# Patient Record
Sex: FEMALE | Race: OTHER
[De-identification: names, ages, dates, MRNs, and addresses within clinical notes are randomized per-mention and may not be internally consistent; named-entity substitution may affect disease eponyms.]

---

## 2020-11-23 ENCOUNTER — HOSPITAL ENCOUNTER (EMERGENCY)
Dept: HOSPITAL 62 - ER | Age: 60
LOS: 1 days | Discharge: HOME | End: 2020-11-24
Payer: COMMERCIAL

## 2020-11-23 DIAGNOSIS — R07.9: ICD-10-CM

## 2020-11-23 DIAGNOSIS — R10.811: ICD-10-CM

## 2020-11-23 DIAGNOSIS — I10: ICD-10-CM

## 2020-11-23 DIAGNOSIS — M25.519: ICD-10-CM

## 2020-11-23 DIAGNOSIS — E11.9: ICD-10-CM

## 2020-11-23 DIAGNOSIS — M54.9: ICD-10-CM

## 2020-11-23 DIAGNOSIS — R10.816: ICD-10-CM

## 2020-11-23 DIAGNOSIS — I25.10: ICD-10-CM

## 2020-11-23 DIAGNOSIS — K80.70: Primary | ICD-10-CM

## 2020-11-23 DIAGNOSIS — R11.14: ICD-10-CM

## 2020-11-23 DIAGNOSIS — R10.11: ICD-10-CM

## 2020-11-23 LAB
ADD MANUAL DIFF: NO
ALBUMIN SERPL-MCNC: 4.6 G/DL (ref 3.5–5)
ALP SERPL-CCNC: 63 U/L (ref 38–126)
ANION GAP SERPL CALC-SCNC: 14 MMOL/L (ref 5–19)
APPEARANCE UR: CLEAR
APTT PPP: YELLOW S
AST SERPL-CCNC: 24 U/L (ref 14–36)
BASOPHILS # BLD AUTO: 0 10^3/UL (ref 0–0.2)
BASOPHILS NFR BLD AUTO: 0.5 % (ref 0–2)
BILIRUB DIRECT SERPL-MCNC: 0 MG/DL (ref 0–0.4)
BILIRUB SERPL-MCNC: 2 MG/DL (ref 0.2–1.3)
BILIRUB UR QL STRIP: NEGATIVE
BUN SERPL-MCNC: 24 MG/DL (ref 7–20)
CALCIUM: 10.3 MG/DL (ref 8.4–10.2)
CHLORIDE SERPL-SCNC: 94 MMOL/L (ref 98–107)
CO2 SERPL-SCNC: 31 MMOL/L (ref 22–30)
EOSINOPHIL # BLD AUTO: 1 10^3/UL (ref 0–0.6)
EOSINOPHIL NFR BLD AUTO: 9.2 % (ref 0–6)
ERYTHROCYTE [DISTWIDTH] IN BLOOD BY AUTOMATED COUNT: 12.9 % (ref 11.5–14)
GLUCOSE SERPL-MCNC: 212 MG/DL (ref 75–110)
GLUCOSE UR STRIP-MCNC: NEGATIVE MG/DL
HCT VFR BLD CALC: 39.5 % (ref 36–47)
HGB BLD-MCNC: 13.4 G/DL (ref 12–15.5)
KETONES UR STRIP-MCNC: NEGATIVE MG/DL
LYMPHOCYTES # BLD AUTO: 1.2 10^3/UL (ref 0.5–4.7)
LYMPHOCYTES NFR BLD AUTO: 11.4 % (ref 13–45)
MCH RBC QN AUTO: 28.7 PG (ref 27–33.4)
MCHC RBC AUTO-ENTMCNC: 33.8 G/DL (ref 32–36)
MCV RBC AUTO: 85 FL (ref 80–97)
MONOCYTES # BLD AUTO: 0.8 10^3/UL (ref 0.1–1.4)
MONOCYTES NFR BLD AUTO: 7.4 % (ref 3–13)
NEUTROPHILS # BLD AUTO: 7.6 10^3/UL (ref 1.7–8.2)
NEUTS SEG NFR BLD AUTO: 71.5 % (ref 42–78)
NITRITE UR QL STRIP: NEGATIVE
PH UR STRIP: 6 [PH] (ref 5–9)
PLATELET # BLD: 152 10^3/UL (ref 150–450)
POTASSIUM SERPL-SCNC: 3.9 MMOL/L (ref 3.6–5)
PROT SERPL-MCNC: 8 G/DL (ref 6.3–8.2)
PROT UR STRIP-MCNC: NEGATIVE MG/DL
RBC # BLD AUTO: 4.66 10^6/UL (ref 3.72–5.28)
SP GR UR STRIP: 1.02
TOTAL CELLS COUNTED % (AUTO): 100 %
UROBILINOGEN UR-MCNC: NEGATIVE MG/DL (ref ?–2)
WBC # BLD AUTO: 10.6 10^3/UL (ref 4–10.5)

## 2020-11-23 PROCEDURE — 36415 COLL VENOUS BLD VENIPUNCTURE: CPT

## 2020-11-23 PROCEDURE — 85025 COMPLETE CBC W/AUTO DIFF WBC: CPT

## 2020-11-23 PROCEDURE — 93010 ELECTROCARDIOGRAM REPORT: CPT

## 2020-11-23 PROCEDURE — 96374 THER/PROPH/DIAG INJ IV PUSH: CPT

## 2020-11-23 PROCEDURE — 81001 URINALYSIS AUTO W/SCOPE: CPT

## 2020-11-23 PROCEDURE — 99285 EMERGENCY DEPT VISIT HI MDM: CPT

## 2020-11-23 PROCEDURE — 83690 ASSAY OF LIPASE: CPT

## 2020-11-23 PROCEDURE — 80053 COMPREHEN METABOLIC PANEL: CPT

## 2020-11-23 PROCEDURE — 93005 ELECTROCARDIOGRAM TRACING: CPT

## 2020-11-23 PROCEDURE — 87086 URINE CULTURE/COLONY COUNT: CPT

## 2020-11-23 PROCEDURE — 76705 ECHO EXAM OF ABDOMEN: CPT

## 2020-11-23 PROCEDURE — 96375 TX/PRO/DX INJ NEW DRUG ADDON: CPT

## 2020-11-23 PROCEDURE — 84484 ASSAY OF TROPONIN QUANT: CPT

## 2020-11-23 PROCEDURE — 96361 HYDRATE IV INFUSION ADD-ON: CPT

## 2020-11-23 NOTE — EKG REPORT
SEVERITY:- BORDERLINE ECG -

SINUS RHYTHM

PROBABLE LEFT ATRIAL ABNORMALITY

LEFT AXIS DEVIATION

:

Confirmed by: Zafar Patel MD 23-Nov-2020 17:31:38

## 2020-11-23 NOTE — RADIOLOGY REPORT (SQ)
EXAM DESCRIPTION:  U/S ABDOMEN LIMITED W/O DOP



IMAGES COMPLETED DATE/TIME:  11/23/2020 5:48 pm



REASON FOR STUDY:  Right upper quad pain



COMPARISON:  None.



TECHNIQUE:  Dynamic and static grayscale images acquired of the abdomen and recorded on PACS. Additio
nal selected color Doppler and spectral images recorded.



LIMITATIONS:  None.



FINDINGS:  PANCREAS: No masses.  Visualized pancreatic duct normal caliber.

LIVER: No masses. Echotexture normal.

LIVER VASCULATURE: Normal directional flow of the main portal vein and hepatic veins.

GALLBLADDER: Gallstone(s). No pericholecystic fluid. No wall thickening.

ULTRASOUND-DETECTED FISHMAN'S SIGN: Positive.

INTRAHEPATIC DUCTS AND COMMON DUCT: CBD and intrahepatic ducts normal caliber. No filling defects.

INFERIOR VENA CAVA: Normal flow.

AORTA: No aneurysm.

RIGHT KIDNEY:  Normal size. Normal echogenicity. No solid or suspicious masses.  Incidental note is m
eli of a 2 cm cyst versus calyceal diverticulum.  No hydronephrosis. No calcifications.

PERITONEAL AND RIGHT PLEURAL SPACE: No ascites or effusions.

OTHER: No other significant findings.



IMPRESSION:  Positive sonographic Fishman sign.  Cholelithiasis without evidence of cholecystitis.



TECHNICAL DOCUMENTATION:  JOB ID:  [id number]

 2011 [employer]- All Rights Reserved



Reading location - IP/workstation name: [email or web address]

## 2020-11-23 NOTE — ER DOCUMENT REPORT
ED Medical Screen (RME)





- General


Chief Complaint: Chest Pain


Stated Complaint: CHEST PAIN,NAUSEA,VOMITING


Time Seen by Provider: 11/23/20 16:43


Primary Care Provider: 


STEVE MONTANEZ PA-C [Primary Care Provider] - Follow up as needed


Mode of Arrival: Wheelchair


Information source: Patient, Relative


Notes: 





Patient is a 60-year-old female comes emergency room accompanied by family with 

complaint of right upper quadrant pain radiating to the back.  She also states 

anterior chest pain and she is also short of breath.  Patient states that she 

has a history of 4 cardiac stents in the past.  This pain started in the middle 

of the night last night and has been colicky and spasmatic wise.  Patient states

the spasms have gone more intense and more frequent in the last few hours.  She 

denies any diarrhea but has had vomiting x1 with a green bile type presentation.

 She denies any history of diabetes.  Patient does not smoke drink or do drugs. 

Stated she does state she has been short of breath with this as well.





Patient examination: Patient is a well-nourished well-developed 60-year-old 

female though in no apparent distress is very uncomfortable appearing.


Cardiac: Cardiac shows a regular rate and rhythm without any murmurs.


Lungs: Bilateral breath sounds decreased throughout no rhonchi rales or wheeze 

are noted however patient was noted to be 93% on room air on her pulse ox.


Abdomen: Examination of patient's abdomen shows severe tenderness right upper 

quad area positive Homans most likely.


Chest: Palpation patient anterior chest does not show any reproducible 

tenderness.





Patient was placed on oxygen out in triage secondary to her saturation 93%.  

Denies any known contact with COVID-19.





I have greeted and performed a rapid initial assessment of this patient.  A 

comprehensive ED assessment and evaluation of the patient, analysis of test 

results and completion of the medical decision making process will be conducted 

by additional ED providers.  Dictation of this chart was performed using voice 

recognition software; therefore, there may be some unintended grammatical 

errors.


TRAVEL OUTSIDE OF THE [place] IN LAST 30 DAYS: No





- Related Data


Allergies/Adverse Reactions: 


                                        





No Known Allergies Allergy (Verified 03/07/16 15:10)


   











Physical Exam





- Vital signs


Vitals: 





                                        











Temp Pulse Resp BP Pulse Ox


 


 97.8 F   103 H  19   153/96 H  93 


 


 11/23/20 16:13  11/23/20 16:13  11/23/20 16:13  11/23/20 16:13  11/23/20 16:13














Course





- Vital Signs


Vital signs: 





                                        











Temp Pulse Resp BP Pulse Ox


 


 97.8 F   103 H  19   153/96 H  93 


 


 11/23/20 16:13  11/23/20 16:13  11/23/20 16:13  11/23/20 16:13  11/23/20 16:13














Doctor's Discharge





- Discharge


Referrals: 


STEVE MONTANEZ PA-C [Primary Care Provider] - Follow up as needed

## 2020-11-23 NOTE — ER DOCUMENT REPORT
ED General





- General


Chief Complaint: Chest Pain


Stated Complaint: CHEST PAIN,NAUSEA,VOMITING


Time Seen by Provider: 11/23/20 16:43


Primary Care Provider: 


STEVE MONTANEZ PA-C [Primary Care Provider] - Follow up as needed


Mode of Arrival: Wheelchair


TRAVEL OUTSIDE OF THE [place] IN LAST 30 DAYS: No





- HPI


Notes: 





Patient is a 60-year-old female with a history of coronary artery disease who 

presents the emergency department for evaluation of right upper quadrant pain.  

Last night she had chickpeas for dinner.  About a half an hour later she started

having abdominal pain.  Is in her right upper quadrant.  Radiates around to her 

back, occasionally up into her chest.  Is occasionally in her shoulder as well. 

She rates it a 10 out of 10.  She has had nausea with one episode of bilious 

emesis.  She is felt nauseated all day.  No fevers or chills.  She is never had 

pain like this in the past.  Normal bowel movements.





- Related Data


Allergies/Adverse Reactions: 


                                        





No Known Allergies Allergy (Verified 03/07/16 15:10)


   








Home Medications: List reviewed at bedside





Past Medical History





- General


Information source: Patient, Relative





- Social History


Smoking Status: Never Smoker


Family History: CAD, DM, Hypertension


Patient has homicidal ideation: No





- Past Medical History


Cardiac Medical History: Reports: Hx Coronary Artery Disease, Hx 

Hypercholesterolemia, Hx Hypertension


Neurological Medical History: Reports: Hx Cerebrovascular Accident


Endocrine Medical History: Reports: Hx Diabetes Mellitus Type 2





Review of Systems





- Review of Systems


Constitutional: No symptoms reported


EENT: No symptoms reported


Cardiovascular: See HPI


Respiratory: No symptoms reported


Gastrointestinal: See HPI


Genitourinary: No symptoms reported


Musculoskeletal: See HPI


Skin: No symptoms reported


Neurological/Psychological: No symptoms reported


-: Yes All other systems reviewed and negative





Physical Exam





- Vital signs


Vitals: 


                                        











Temp Pulse Resp BP Pulse Ox


 


 97.8 F   103 H  19   153/96 H  93 


 


 11/23/20 16:13  11/23/20 16:13  11/23/20 16:13  11/23/20 16:13  11/23/20 16:13














- Notes


Notes: 





This is a 60-year-old female who appears her stated age, in a moderate amount of

distress.  Vital signs reviewed, please refer to chart. Head is normocephalic, 

atraumatic.  Pupils equal round, reactive to light.  Neck is supple without 

meningismus.  Heart is regular rate and rhythm.  Lungs are clear to auscultation

bilaterally.  Abdomen is soft, moderately tender in the epigastrium and right 

upper quadrant without rebound or guarding, normoactive bowel sounds throughout.

 Extremities without cyanosis, clubbing. Posterior calves are nontender.  Periph

eral pulses are equal.  Skin is warm and dry.  Patient is awake, alert, 

neurological exam is nonfocal.





Course





- Re-evaluation


Re-evalutation: 





11/23/20 22:24


Patient presents emergency department for evaluation.  She was initially seen 

through triage.  Laboratory investigations were obtained.  Her labs are largely 

unremarkable.  Patient's ultrasound reveals cholelithiasis without clear 

evidence of cholecystitis and a positive sonographic Fishman sign.  I did briefly

discuss this case with surgeon, Dr. Espinal.  He states that in the absence of 

any emergent need to remove this gallbladder at this time, she should be off of 

her Plavix for at least 5 days for surgery.  He recommends referral on to the 

surgery clinic and having patient hold her Plavix.  At this point I am giving 

her morphine and Zofran for symptoms, IV fluids.  She is currently stable, we 

will reassess.


11/24/20 00:07


Patient feeling significantly improved after pain medication, nausea medication.

 No further vomiting.  Her vital signs improved, including her hypertension.  

Again she shows cholelithiasis signs without clear signs of infection or 

choledocholithiasis.  Patient was counseled on dietary changes.  She was 

counseled on follow-up with surgery soon as possible to discuss cholecystectomy 

prior to it becoming an urgent or emergent procedure.  She voiced understanding.

 I will send her home with pain medication, nausea medication, surgery referral.

 She is to return to the ED with worsening.





- Vital Signs


Vital signs: 


                                        











Temp Pulse Resp BP Pulse Ox


 


 99.1 F   77   21 H  142/107 H  97 


 


 11/23/20 20:41  11/23/20 20:41  11/23/20 20:57  11/23/20 20:57  11/23/20 20:57














- Laboratory


Result Diagrams: 


                                 11/23/20 17:10





                                 11/23/20 17:10


Laboratory results interpreted by me: 


                                        











  11/23/20 11/23/20





  17:10 17:10


 


WBC  10.6 H 


 


Lymph % (Auto)  11.4 L 


 


Eos % (Auto)  9.2 H 


 


Absolute Eos (auto)  1.0 H 


 


Chloride   94 L


 


Carbon Dioxide   31 H


 


BUN   24 H


 


Glucose   212 H


 


Calcium   10.3 H


 


Total Bilirubin   2.0 H














- Diagnostic Test


Radiology reviewed: Reports reviewed


Radiology results interpreted by me: 





11/24/20 00:07





                                        





Abdomen Ultrasound  11/23/20 17:08


IMPRESSION:  Positive sonographic Fishman sign.  Cholelithiasis without evidence 

of cholecystitis.


 














- EKG Interpretation by Me


Additional EKG results interpreted by me: 





11/23/20 22:25


Sinus mechanism with rate of 96 bpm.  Left axis deviation.  No acute ST changes 

concerning for ischemia or infarction.





Discharge





- Discharge


Clinical Impression: 


 Biliary colic, Cholelithiasis





Condition: Stable


Disposition: HOME, SELF-CARE


Instructions:  Gallbladder Disease ([facility])


Additional Instructions: 


You were seen for pain in your abdomen that is likely related to gallstones. 

Your work-up today does not show any signs that you need to have your 

gallbladder removed tonight. However, you will likely need surgery as an 

outpatient in the coming weeks. Please contact the surgery clinic in the next 

24-48 hours to discuss the need for further evaluation and consideration of 

surgery. Return to the ED immediately if you develop worsening pain, persistent 

vomiting, become unable to tolerate fluids, have a fever of >100.4, or any other

symptoms that are concerning to you.


Referrals: 


STEVE MONTANEZ PA-C [Primary Care Provider] - Follow up as needed

## 2020-11-24 VITALS — DIASTOLIC BLOOD PRESSURE: 73 MMHG | SYSTOLIC BLOOD PRESSURE: 123 MMHG

## 2021-01-18 LAB
ADD MANUAL DIFF: NO
ANION GAP SERPL CALC-SCNC: 9 MMOL/L (ref 5–19)
BASOPHILS # BLD AUTO: 0 10^3/UL (ref 0–0.2)
BASOPHILS NFR BLD AUTO: 0.6 % (ref 0–2)
CHLORIDE SERPL-SCNC: 100 MMOL/L (ref 98–107)
CO2 SERPL-SCNC: 31 MMOL/L (ref 22–30)
EOSINOPHIL # BLD AUTO: 0.2 10^3/UL (ref 0–0.6)
EOSINOPHIL NFR BLD AUTO: 4 % (ref 0–6)
ERYTHROCYTE [DISTWIDTH] IN BLOOD BY AUTOMATED COUNT: 13 % (ref 11.5–14)
HCT VFR BLD CALC: 37.9 % (ref 36–47)
HGB BLD-MCNC: 12.9 G/DL (ref 12–15.5)
LYMPHOCYTES # BLD AUTO: 1.8 10^3/UL (ref 0.5–4.7)
LYMPHOCYTES NFR BLD AUTO: 31.2 % (ref 13–45)
MCH RBC QN AUTO: 28.6 PG (ref 27–33.4)
MCHC RBC AUTO-ENTMCNC: 34.2 G/DL (ref 32–36)
MCV RBC AUTO: 84 FL (ref 80–97)
MONOCYTES # BLD AUTO: 0.4 10^3/UL (ref 0.1–1.4)
MONOCYTES NFR BLD AUTO: 7.7 % (ref 3–13)
NEUTROPHILS # BLD AUTO: 3.2 10^3/UL (ref 1.7–8.2)
NEUTS SEG NFR BLD AUTO: 56.5 % (ref 42–78)
PLATELET # BLD: 130 10^3/UL (ref 150–450)
POTASSIUM SERPL-SCNC: 3.7 MMOL/L (ref 3.6–5)
RBC # BLD AUTO: 4.53 10^6/UL (ref 3.72–5.28)
TOTAL CELLS COUNTED % (AUTO): 100 %
WBC # BLD AUTO: 5.6 10^3/UL (ref 4–10.5)

## 2021-01-21 ENCOUNTER — HOSPITAL ENCOUNTER (OUTPATIENT)
Dept: HOSPITAL 62 - OROUT | Age: 61
Discharge: HOME | End: 2021-01-21
Attending: SURGERY
Payer: COMMERCIAL

## 2021-01-21 VITALS — SYSTOLIC BLOOD PRESSURE: 156 MMHG | DIASTOLIC BLOOD PRESSURE: 88 MMHG

## 2021-01-21 DIAGNOSIS — Z79.82: ICD-10-CM

## 2021-01-21 DIAGNOSIS — K80.10: Primary | ICD-10-CM

## 2021-01-21 DIAGNOSIS — Z20.822: ICD-10-CM

## 2021-01-21 DIAGNOSIS — I25.10: ICD-10-CM

## 2021-01-21 DIAGNOSIS — Z79.02: ICD-10-CM

## 2021-01-21 DIAGNOSIS — E78.5: ICD-10-CM

## 2021-01-21 DIAGNOSIS — Z95.5: ICD-10-CM

## 2021-01-21 DIAGNOSIS — Z01.812: ICD-10-CM

## 2021-01-21 DIAGNOSIS — I10: ICD-10-CM

## 2021-01-21 DIAGNOSIS — Z79.84: ICD-10-CM

## 2021-01-21 DIAGNOSIS — E11.9: ICD-10-CM

## 2021-01-21 DIAGNOSIS — Z86.73: ICD-10-CM

## 2021-01-21 PROCEDURE — 47562 LAPAROSCOPIC CHOLECYSTECTOMY: CPT

## 2021-01-21 PROCEDURE — 82962 GLUCOSE BLOOD TEST: CPT

## 2021-01-21 PROCEDURE — 36415 COLL VENOUS BLD VENIPUNCTURE: CPT

## 2021-01-21 PROCEDURE — C9290 INJ, BUPIVACAINE LIPOSOME: HCPCS

## 2021-01-21 PROCEDURE — C9803 HOPD COVID-19 SPEC COLLECT: HCPCS

## 2021-01-21 PROCEDURE — 87635 SARS-COV-2 COVID-19 AMP PRB: CPT

## 2021-01-21 PROCEDURE — 88304 TISSUE EXAM BY PATHOLOGIST: CPT

## 2021-01-21 PROCEDURE — 80051 ELECTROLYTE PANEL: CPT

## 2021-01-21 PROCEDURE — 85025 COMPLETE CBC W/AUTO DIFF WBC: CPT

## 2021-01-21 NOTE — DISCHARGE SUMMARY
Discharge Summary (SDC)





- Discharge


Final Diagnosis: 





Dramatic cholelithiasis


Date of Surgery: 01/21/21


Discharge Date: 01/21/21


Condition: Good


Prescriptions: 


Hydrocodone/Acetaminophen [Norco 7.5-325 mg Tablet] 1 tab PO Q6HP PRN #10 tablet


 PRN Reason: 


Referrals: 


STEVE MONTANEZ PA-C [Primary Care Provider] - 


Discharge Diet: As Tolerated


Discharge Activity: Activity As Tolerated, No Lifting Over 10 Pounds


Report the Following to Your Physician Immediately: Yellow Skin - Patient needs 

a follow-up with me in 7 to 10 days, Unusual Bleeding

## 2021-01-21 NOTE — XMS REPORT
Patient Summary Document

                           Created on:2021



Patient:JOAQUINA WATTS

Sex:Female

:1960

External Reference #:[id number]





Demographics







                          Address                   [address] [place] [address] 



                                                    [place] [address]

 

                          Home Phone                [phone]

 

                          Preferred Language        [id number]

 

                          Marital Status            Unknown

 

                          Shinto Affiliation     Unknown

 

                          Race                      Unknown

 

                          Additional Race(s)        Unavailable

 

                          Ethnic Group              Unknown









Author







                          Organization              [facility]

 

                          Address                   [address] [address]

 

                          Phone                     [phone]









Care Team Providers







                    Name                Role                Phone

 

                    Unavailable         Unavailable         Unavailable









Allergies, Adverse Reactions, Alerts

This patient has no known allergies or adverse reactions.



Medications

This patient has no known medications.



Problems

This patient has no known problems.



Procedures

This patient has no known procedures.



Results

This patient has no known results.



Social History

This patient has no known social history.



Vital Signs

This patient has no known vital signs.

## 2021-01-21 NOTE — OPERATIVE REPORT
Nonrecallable Operative Report


DATE OF SURGERY: 01/21/21


PREOPERATIVE DIAGNOSIS: Symptomatic cholelithiasis


POSTOPERATIVE DIAGNOSIS: Same


OPERATION: Laparoscopic cholecystectomy


SURGEON: CATARINA WILKS ASSISTANT: KIMBERLY OTOOLE


ANESTHESIA: GA


TISSUE REMOVED OR ALTERED: Gallbladder


COMPLICATIONS: 





None


INTRAOPERATIVE FINDINGS: see note


PROCEDURE: 





After obtaining informed consent, the patient was taken to the operating room.  

General  Anesthesia was induced; the arms were extended, and the abdomen was 

exposed, and prepped and draped in a sterile fashion.  Instrumentation was set 

up for laparoscopic cholecystectomy.


 


Surgical plan and surgical timeout were conducted.


 


A vertical incision was made above the umbilicus, and a verres needle was 

inserted uneventfully into the peritoneal cavity.  Pneumoperitoneum was 

established.


 


The verres needle was removed and a 10 mm trocar was inserted and a 10 mm





MARIMAR Gonzales was present for the entire procedure for help with wound 

retraction wound closure e laparoscope was inserted.  Visualization of the 

peritoneal cavity confirmed safe uneventful entry.  Under direct visualization 3

additional 5 mm ports were established, one in the subxiphoid position and 

second in the subcostal position.  Visualization of the hepatobiliary anatomy 

revealed no anatomic variations.  A grasper was placed on the fundus of the 

gallbladder and the gallbladder is elevated over the right surface of the liver;

a second grasper was used to grasp the infundibulum of the gallbladder.  The 

neck of the gallbladder and junction with the cystic duct was dissected out.  

The Cystic artery was in its usual location medial and cephalad to the cystic d

uct.  The cystic artery was surrounded with a right angle clamp, clipped twice 

proximally and divided with laparoscopic scissors.


 


We now opened the triangle of Calot by dividing the peritoneal reflection on 

both the medial and lateral sides of the cystic duct infundibular junction.  The

critical view was obtained.  We now milked the cystic duct of any possible 

stones, clipped the cystic duct approximately 2 times once distally and divided 

with scissors.


 


The gallbladder was now removed from the undersurface of the liver using hook 

cautery dissection.  Graspers were repositioned and the gallbladder was removed 

uneventfully from the abdominal cavity through the super umbilical port site 

incision.  The specimen was examined, then passed off to pathology for permanent

analysis.


 


We returned to the peritoneal cavity check for bleeding, and evidence of bile 

leak, and there was none.  We  Confirmed satisfactory placement of clips on 

cystic duct and cystic artery were secured .  At this point we felt  the 

operation was complete.  The subcutaneous tissue was then anesthetized  with 

quarter percent Marcaine Sponge and needle counts are correct.  All ports 

removed under direct visualization pneumoperitoneum evacuated, and 5 mm port 

wounds closed with 3-0 Vicryl suture, benzoin and Steri-Strips.


 


The patient was extubated, and taken to the recovery room in stable condition.


Kimberly Stokes was present for the entire procedure for help with wound 

retraction and wound closure